# Patient Record
Sex: FEMALE | Race: ASIAN | NOT HISPANIC OR LATINO
[De-identification: names, ages, dates, MRNs, and addresses within clinical notes are randomized per-mention and may not be internally consistent; named-entity substitution may affect disease eponyms.]

---

## 2023-06-23 PROBLEM — Z00.00 ENCOUNTER FOR PREVENTIVE HEALTH EXAMINATION: Status: ACTIVE | Noted: 2023-06-23

## 2023-06-28 ENCOUNTER — APPOINTMENT (OUTPATIENT)
Dept: PEDIATRIC ORTHOPEDIC SURGERY | Facility: CLINIC | Age: 58
End: 2023-06-28
Payer: MEDICAID

## 2023-06-28 VITALS — WEIGHT: 54 LBS | TEMPERATURE: 97.8 F | HEIGHT: 64 IN | BODY MASS INDEX: 9.22 KG/M2

## 2023-06-28 DIAGNOSIS — M54.12 RADICULOPATHY, CERVICAL REGION: ICD-10-CM

## 2023-06-28 DIAGNOSIS — M54.16 RADICULOPATHY, LUMBAR REGION: ICD-10-CM

## 2023-06-28 DIAGNOSIS — M17.0 BILATERAL PRIMARY OSTEOARTHRITIS OF KNEE: ICD-10-CM

## 2023-06-28 PROCEDURE — 72100 X-RAY EXAM L-S SPINE 2/3 VWS: CPT

## 2023-06-28 PROCEDURE — 99203 OFFICE O/P NEW LOW 30 MIN: CPT

## 2023-06-28 PROCEDURE — 72040 X-RAY EXAM NECK SPINE 2-3 VW: CPT

## 2023-06-28 PROCEDURE — 73560 X-RAY EXAM OF KNEE 1 OR 2: CPT | Mod: 50

## 2023-06-28 NOTE — PHYSICAL EXAM
[de-identified] : Examination today reveals a slim body habitus straight spine level pelvis no leg length discrepancy and a normal gait.  She does have good motion to the cervical and lumbar segments in all planes though it is with discomfort at the limits.  There is spasm and tenderness on both sides and midline in both the cervical subaxial region as well as the lumbar segments.  Mild tenderness in the posterior wall of the pelvis is noted as well more so on the right.  No trigger points throughout the exam.  She has good motion to both upper extremities at all levels with good strength.  With regards to the lower extremities there are no significant tension signs present.  Straight leg raising is uncomfortable though negative.  She has good motion to both hips and knees the knees have small effusions no instability on stress.  There is mild pain on patellofemoral grind no instability.  There is no significant joint line tenderness present negative Steinmann/Luiz at both sides.\par \par X-rays ordered and taken today of the cervical and lumbar spine as well as both knees reveal cervical spondylosis with disc space narrowing at C5-6 with mild facet arthritis.  The lumbar study reveals multilevel degenerative disc space narrowing with spondylosis most pronounced at L4-5.  Mild facet arthritis no lesion.  X-rays of the knees reveal mild degenerative change on both sides no loose body/lesion

## 2023-06-28 NOTE — HISTORY OF PRESENT ILLNESS
[de-identified] : This 57-year-old who was recently here from [facility] is seen today for evaluation of neck and back pain as well as her knees.  This patient has a 10-year history of recurring episodes of neck and back pain for which she has been treated conservatively in [facility].  She has had MRIs in the past which have documented herniated disc at both the cervical and lumbar segments.  She recently has had a flare of pain in both sites with mild occasional radiation into both upper and lower extremities.  She also has had discomfort with occasional stiffness and swelling to her knees though no locking buckling or sensation of instability.  With regards to spinal complaints there is no motor weakness bladder or bowel dysfunction.  Her general health is good no allergies no medications.

## 2023-06-28 NOTE — ASSESSMENT
[FreeTextEntry1] : Impression: Cervical and lumbar radiculitis.  Mild arthritic changes to both knees.\par \par This patient will be treated with formal physical therapy along with a course of Mobic 7.5 mg with GI precautions.  She will return if she is not improving

## 2023-07-05 RX ORDER — MELOXICAM 15 MG/1
15 TABLET ORAL
Qty: 30 | Refills: 1 | Status: ACTIVE | COMMUNITY
Start: 2023-07-05 | End: 1900-01-01